# Patient Record
Sex: MALE | Race: OTHER | Employment: OTHER
[De-identification: names, ages, dates, MRNs, and addresses within clinical notes are randomized per-mention and may not be internally consistent; named-entity substitution may affect disease eponyms.]

---

## 2018-10-22 ENCOUNTER — NEW PATIENT EMERGENCY (OUTPATIENT)
Age: 63
End: 2018-10-22

## 2018-10-22 DIAGNOSIS — S05.01XA: ICD-10-CM

## 2018-10-22 DIAGNOSIS — T15.01XA: ICD-10-CM

## 2018-10-22 DIAGNOSIS — B02.39: ICD-10-CM

## 2018-10-22 DIAGNOSIS — B00.52: ICD-10-CM

## 2018-10-22 DIAGNOSIS — H57.11: ICD-10-CM

## 2018-10-22 PROCEDURE — 99203 OFFICE O/P NEW LOW 30 MIN: CPT

## 2018-10-22 RX ORDER — AMOXICILLIN 500 MG/1
1 CAPSULE ORAL
Start: 2018-10-22 | End: 2018-10-28

## 2018-10-22 RX ORDER — TRIFLURIDINE 1 G/100ML
1 SOLUTION OPHTHALMIC
Start: 2018-10-22 | End: 2018-10-28

## 2018-10-22 RX ORDER — VALACYCLOVIR HYDROCHLORIDE 500 MG/1
1 TABLET, FILM COATED ORAL TWICE A DAY
Start: 2018-10-22 | End: 2018-10-31

## 2018-10-22 ASSESSMENT — VISUAL ACUITY
OD_CC: 20/40
OS_CC: 20/25-1

## 2018-10-23 ENCOUNTER — FOLLOW UP (OUTPATIENT)
Age: 63
End: 2018-10-23

## 2018-10-23 DIAGNOSIS — S05.01XA: ICD-10-CM

## 2018-10-23 DIAGNOSIS — T15.01XA: ICD-10-CM

## 2018-10-23 DIAGNOSIS — H57.11: ICD-10-CM

## 2018-10-23 DIAGNOSIS — B02.39: ICD-10-CM

## 2018-10-23 DIAGNOSIS — B00.52: ICD-10-CM

## 2018-10-23 PROCEDURE — 99212 OFFICE O/P EST SF 10 MIN: CPT

## 2018-10-23 ASSESSMENT — VISUAL ACUITY
OD_CC: 20/30-1
OS_CC: 20/25+2

## 2018-10-23 ASSESSMENT — TONOMETRY: OD_IOP_MMHG: 18

## 2018-11-05 ENCOUNTER — FOLLOW UP (OUTPATIENT)
Age: 63
End: 2018-11-05

## 2018-11-05 DIAGNOSIS — B02.39: ICD-10-CM

## 2018-11-05 DIAGNOSIS — B00.52: ICD-10-CM

## 2018-11-05 DIAGNOSIS — S05.01XA: ICD-10-CM

## 2018-11-05 DIAGNOSIS — T15.01XA: ICD-10-CM

## 2018-11-05 DIAGNOSIS — H57.11: ICD-10-CM

## 2018-11-05 PROCEDURE — 99212 OFFICE O/P EST SF 10 MIN: CPT

## 2018-11-05 ASSESSMENT — VISUAL ACUITY
OD_CC: 20/30
OS_CC: 20/25

## 2019-11-19 NOTE — PATIENT DISCUSSION
[id number] - IOP ON HIGHER SIDE OF NORMAL - IF IOP IMPROVES CONSIDER STOPPING.

## 2020-01-30 NOTE — PATIENT DISCUSSION
IOP HIGH NORMAL OFF STEROIDS & SIG DISC CHANGES - PROB UNDERLYING GLAUCOMA - RECOM CONTINUING COMBIGAN.

## 2020-01-30 NOTE — PATIENT DISCUSSION
[id number] - IOP ON HIGHER SIDE OF NORMAL - IF IOP IMPROVES CONSIDER STOPPING.

## 2020-05-12 NOTE — PATIENT DISCUSSION
[id number] - IOP ON HIGHER SIDE OF NORMAL - IF IOP IMPROVES CONSIDER STOPPING.

## 2020-11-10 NOTE — PATIENT DISCUSSION
IOP HIGH NORMAL OFF STEROIDS & SIG DISC CHANGES - PROB UNDERLYING GLAUCOMA - RECOM CONTINUING COMBIGAN AS RESUMING STEROID DROP 11 10 20.

## 2020-11-10 NOTE — PATIENT DISCUSSION
[id number] - IOP ON HIGHER SIDE OF NORMAL - IF IOP IMPROVES CONSIDER STOPPING.

## 2020-11-10 NOTE — PATIENT DISCUSSION
11 10 20 INCREASED EDEMA ON - TO START INVELTYS AND BROMFINAC BID GIVEN HX OF ^IOP WITH STEROIDS AND REEVAL IN 5 WKS.

## 2021-01-06 NOTE — PATIENT DISCUSSION
[id number] - IOP ON HIGHER SIDE OF NORMAL - IF IOP IMPROVES CONSIDER STOPPING.

## 2021-02-23 NOTE — PATIENT DISCUSSION
2 23 21 CONTINUES TO DO WELL - REVIEWED WITH PT OPTION CONTINUING DROPS VS STOPPING WITH ASSOCIATED RISK OF RECURRENCE - PT WANTS TO CONTINUE DROPS.

## 2022-12-21 ENCOUNTER — CONSULTATION/EVALUATION (OUTPATIENT)
Dept: URBAN - METROPOLITAN AREA CLINIC 43 | Facility: CLINIC | Age: 67
End: 2022-12-21

## 2022-12-21 ENCOUNTER — EMERGENCY VISIT (OUTPATIENT)
Dept: URBAN - METROPOLITAN AREA CLINIC 39 | Facility: CLINIC | Age: 67
End: 2022-12-21

## 2022-12-21 PROCEDURE — 92250 FUNDUS PHOTOGRAPHY W/I&R: CPT

## 2022-12-21 PROCEDURE — 92004 COMPRE OPH EXAM NEW PT 1/>: CPT

## 2022-12-21 PROCEDURE — 67145 PROPH RTA DTCHMNT PC: CPT

## 2022-12-21 PROCEDURE — 99215 OFFICE O/P EST HI 40 MIN: CPT

## 2022-12-21 ASSESSMENT — TONOMETRY
OS_IOP_MMHG: 16
OD_IOP_MMHG: 15
OD_IOP_MMHG: 15
OS_IOP_MMHG: 16

## 2022-12-21 ASSESSMENT — VISUAL ACUITY
OD_CC: 20/60
OD_CC: 20/60
OS_CC: 20/20
OS_CC: 20/20
OD_PH: 20/50

## 2022-12-22 ENCOUNTER — FOLLOW UP (OUTPATIENT)
Dept: URBAN - METROPOLITAN AREA CLINIC 35 | Facility: CLINIC | Age: 67
End: 2022-12-22

## 2022-12-22 PROCEDURE — 99213 OFFICE O/P EST LOW 20 MIN: CPT

## 2022-12-22 ASSESSMENT — VISUAL ACUITY
OD_CC: 20/40+1
OS_CC: 20/25-1

## 2022-12-22 ASSESSMENT — TONOMETRY
OS_IOP_MMHG: 13
OD_IOP_MMHG: 10

## 2023-01-03 ENCOUNTER — FOLLOW UP (OUTPATIENT)
Dept: URBAN - METROPOLITAN AREA CLINIC 43 | Facility: CLINIC | Age: 68
End: 2023-01-03

## 2023-01-03 DIAGNOSIS — H33.311: ICD-10-CM

## 2023-01-03 DIAGNOSIS — H43.11: ICD-10-CM

## 2023-01-03 DIAGNOSIS — H35.30: ICD-10-CM

## 2023-01-03 DIAGNOSIS — H43.811: ICD-10-CM

## 2023-01-03 PROCEDURE — 92250 FUNDUS PHOTOGRAPHY W/I&R: CPT

## 2023-01-03 PROCEDURE — 99213 OFFICE O/P EST LOW 20 MIN: CPT

## 2023-01-03 ASSESSMENT — VISUAL ACUITY
OD_CC: 20/25-1
OS_CC: 20/25

## 2023-01-03 ASSESSMENT — TONOMETRY
OD_IOP_MMHG: 15
OS_IOP_MMHG: 15

## 2023-04-04 ENCOUNTER — FOLLOW UP (OUTPATIENT)
Dept: URBAN - METROPOLITAN AREA CLINIC 39 | Facility: CLINIC | Age: 68
End: 2023-04-04

## 2023-04-04 DIAGNOSIS — H33.311: ICD-10-CM

## 2023-04-04 DIAGNOSIS — H35.30: ICD-10-CM

## 2023-04-04 DIAGNOSIS — H43.811: ICD-10-CM

## 2023-04-04 DIAGNOSIS — H43.11: ICD-10-CM

## 2023-04-04 PROCEDURE — 99214 OFFICE O/P EST MOD 30 MIN: CPT

## 2023-04-04 PROCEDURE — 92250 FUNDUS PHOTOGRAPHY W/I&R: CPT

## 2023-04-04 ASSESSMENT — VISUAL ACUITY
OD_CC: 20/25-2
OS_CC: 20/25-3

## 2023-04-04 ASSESSMENT — TONOMETRY
OS_IOP_MMHG: 13
OD_IOP_MMHG: 14

## 2023-08-02 ENCOUNTER — EMERGENCY VISIT (OUTPATIENT)
Dept: URBAN - METROPOLITAN AREA CLINIC 39 | Facility: CLINIC | Age: 68
End: 2023-08-02

## 2023-08-02 DIAGNOSIS — H00.023: ICD-10-CM

## 2023-08-02 DIAGNOSIS — H01.026: ICD-10-CM

## 2023-08-02 DIAGNOSIS — H01.023: ICD-10-CM

## 2023-08-02 DIAGNOSIS — H16.223: ICD-10-CM

## 2023-08-02 DIAGNOSIS — H00.026: ICD-10-CM

## 2023-08-02 PROCEDURE — 92012 INTRM OPH EXAM EST PATIENT: CPT

## 2023-08-02 RX ORDER — NEOMYCIN SULFATE, POLYMYXIN B SULFATE AND DEXAMETHASONE 3.5; 10000; 1 MG/G; [USP'U]/G; MG/G
OINTMENT OPHTHALMIC EVERY EVENING
Start: 2023-08-02

## 2023-08-02 ASSESSMENT — TONOMETRY
OD_IOP_MMHG: 13
OS_IOP_MMHG: 13

## 2023-08-02 ASSESSMENT — VISUAL ACUITY
OD_CC: 20/25-2
OS_CC: 20/30-1

## 2023-10-04 ENCOUNTER — COMPREHENSIVE EXAM (OUTPATIENT)
Dept: URBAN - METROPOLITAN AREA CLINIC 39 | Facility: CLINIC | Age: 68
End: 2023-10-04

## 2023-10-04 DIAGNOSIS — H16.223: ICD-10-CM

## 2023-10-04 DIAGNOSIS — H52.03: ICD-10-CM

## 2023-10-04 DIAGNOSIS — H01.023: ICD-10-CM

## 2023-10-04 DIAGNOSIS — H52.4: ICD-10-CM

## 2023-10-04 DIAGNOSIS — H33.311: ICD-10-CM

## 2023-10-04 DIAGNOSIS — H01.026: ICD-10-CM

## 2023-10-04 DIAGNOSIS — H43.811: ICD-10-CM

## 2023-10-04 DIAGNOSIS — H00.023: ICD-10-CM

## 2023-10-04 DIAGNOSIS — H00.026: ICD-10-CM

## 2023-10-04 DIAGNOSIS — H52.203: ICD-10-CM

## 2023-10-04 PROCEDURE — 92014 COMPRE OPH EXAM EST PT 1/>: CPT

## 2023-10-04 PROCEDURE — 92015 DETERMINE REFRACTIVE STATE: CPT

## 2023-10-04 ASSESSMENT — VISUAL ACUITY
OD_SC: 20/50-1
OS_SC: J12
OU_CC: J1+
OU_CC: 20/20-1
OD_CC: 20/25
OU_SC: 20/50-1
OS_CC: J2
OS_SC: 20/50-2
OD_SC: J12
OS_CC: 20/20-1
OD_CC: J4
OU_SC: J12

## 2023-10-04 ASSESSMENT — TONOMETRY
OS_IOP_MMHG: 14
OD_IOP_MMHG: 14

## 2024-10-02 ENCOUNTER — COMPREHENSIVE EXAM (OUTPATIENT)
Dept: URBAN - METROPOLITAN AREA CLINIC 39 | Facility: CLINIC | Age: 69
End: 2024-10-02

## 2024-10-02 DIAGNOSIS — H52.203: ICD-10-CM

## 2024-10-02 DIAGNOSIS — H01.003: ICD-10-CM

## 2024-10-02 DIAGNOSIS — H52.4: ICD-10-CM

## 2024-10-02 DIAGNOSIS — H01.006: ICD-10-CM

## 2024-10-02 DIAGNOSIS — H43.811: ICD-10-CM

## 2024-10-02 DIAGNOSIS — H52.03: ICD-10-CM

## 2024-10-02 DIAGNOSIS — H33.311: ICD-10-CM

## 2024-10-02 DIAGNOSIS — H16.223: ICD-10-CM

## 2024-10-02 DIAGNOSIS — H35.371: ICD-10-CM

## 2024-10-02 PROCEDURE — 92015 DETERMINE REFRACTIVE STATE: CPT

## 2024-10-02 PROCEDURE — 92014 COMPRE OPH EXAM EST PT 1/>: CPT
